# Patient Record
(demographics unavailable — no encounter records)

---

## 2025-03-25 NOTE — HISTORY OF PRESENT ILLNESS
[Dull/Aching] : dull/aching [Localized] : localized [de-identified] : Had a fall down stairs 3 months ago, had sharp tailbone pain initially that improved, but has gotten worse recently. No bowel or bladder changes.  [] : no [FreeTextEntry1] : ann  [FreeTextEntry5] : tailbone pain that developed a few months ago from falling down steps. Denies prior treatment.